# Patient Record
(demographics unavailable — no encounter records)

---

## 2025-05-09 NOTE — HISTORY OF PRESENT ILLNESS
[FreeTextEntry1] : 34 yo F, no substance use, college with major in English, working as , with PMHx of anxiety, no PFHx, no PSHx, presents here for first evaluation for ADHD and to rule out autism. Started 5-6 years ago but she believes it happened before. Difficulty to get things done, procrastinating often. Her mother is similar. Patient has errors while working and has difficulty to get things done. Patient overthinks but catch social cues fine. She lost her father recently and she feels depressed somehow. She has had times where patient was feeling anxious, more when she feels judged, work builds anxiety and taking financial responsibilities in her family. Somehow restless, no regular sleep time. No tachycardia or increased BP, no weight gain, no tremors, no headaches, no nausea.

## 2025-05-09 NOTE — DISCUSSION/SUMMARY
[FreeTextEntry1] : 34 yo F, no substance use, college with major in English, working as , with PMHx of anxiety, no PFHx, no PSHx, presents here for first evaluation for ADHD and to rule out autism. Started 5-6 years ago but she believes symptoms might have started before. Difficulty to get things done, procrastinating often. Her mother is similar. Patient has errors while working and has difficulty to get things done. Somehow restless sometimes. It impacts significantly her personal, working and social life. She lost her father recently and she feels depressed and anxious.  Impression: Possible ADHD attention deficit predominant with depression and anxiety traits  Recommendations: - Order neuropsychiatric test - No meds recommended for now - Will fill the ADHD Adult Self-Report Questionnaire and submit it - RTC in 4 months

## 2025-05-09 NOTE — PHYSICAL EXAM
[FreeTextEntry1] : Constitutional: alert, in no acute distress and well nourished, nervous and episode of tearing at the beginning of the interview  Psychiatric: oriented to person, place, and time, the affect was normal and recent memory was not impaired. Neurologic: Orientation: oriented to person, oriented to place and oriented to time. Memory: short term memory intact. Fund of knowledge: displays adequate range of vocabulary. Cranial Nerves: visual acuity intact bilaterally, visual fields full to confrontation, pupils equal round and reactive to light, extraocular motion intact, facial sensation intact symmetrically, face symmetrical, hearing was intact bilaterally, head turning and shoulder shrug symmetric and there was no tongue deviation with protrusion. Motor: muscle tone was normal in all four extremities, muscle strength was normal in all four extremities, no involuntary movements were seen and normal bulk in all four extremities. Sensory exam: light touch was intact and vibration was intact. Coordination:. normal gait. balance was intact. Deep tendon reflexes: Biceps right 2+. Biceps left 2+.  Patella right 2+. Patella left 2+.  Eyes: the sclera and conjunctiva were normal and pupils were equal in size, round, reactive to light, with normal accommodation. Neck: the appearance of the neck was normal and the neck was supple. Pulmonary: no respiratory distress.